# Patient Record
(demographics unavailable — no encounter records)

---

## 2024-10-18 NOTE — HEALTH RISK ASSESSMENT
[No] : No [No falls in past year] : Patient reported no falls in the past year [0] : 2) Feeling down, depressed, or hopeless: Not at all (0) [PHQ-2 Negative - No further assessment needed] : PHQ-2 Negative - No further assessment needed [Never] : Never [[id number]] : 0

## 2024-10-18 NOTE — PLAN
[FreeTextEntry1] : Asthma/cough - will treat with azithromycin.  Start singulair and symbicort, c/w albuterol prn Screening for metabolic disorders - will check labs HMT - UTD w/ cancer screenings  f/u in 1 month

## 2024-10-18 NOTE — REVIEW OF SYSTEMS
[Shortness Of Breath] : shortness of breath [Wheezing] : wheezing [Cough] : cough [Fever] : no fever [Night Sweats] : no night sweats [Discharge] : no discharge [Vision Problems] : no vision problems [Earache] : no earache [Nasal Discharge] : no nasal discharge [Chest Pain] : no chest pain [Orthopnea] : no orthopnea [Abdominal Pain] : no abdominal pain [Vomiting] : no vomiting [Dysuria] : no dysuria [Hematuria] : no hematuria [Joint Pain] : no joint pain [Muscle Pain] : no muscle pain [Itching] : no itching [Skin Rash] : no skin rash [Headache] : no headache [Memory Loss] : no memory loss [Suicidal] : not suicidal [Easy Bleeding] : no easy bleeding

## 2024-10-18 NOTE — HISTORY OF PRESENT ILLNESS
[FreeTextEntry8] : 47 year old female presents with complaints of cough and congestion for over 1 week. She went to urgent care last week, was started on albuterol and prednisone with improvement in symptoms, however, this week, her cough is productive with green sputum.  She has been using her albuterol more frequently as well.  Currently she denies any chest pain, shortness of breath or palpitations.

## 2024-10-18 NOTE — HEALTH RISK ASSESSMENT
[No] : No [No falls in past year] : Patient reported no falls in the past year [0] : 2) Feeling down, depressed, or hopeless: Not at all (0) [PHQ-2 Negative - No further assessment needed] : PHQ-2 Negative - No further assessment needed [Never] : Never [[id number]] : 0

## 2024-11-01 NOTE — PLAN
[FreeTextEntry1] : Asthma - much improved w/ advair, singulair.  c/w same HLD - borderline, advised diet/ls modifications HMT - to be addressed at next visit

## 2024-11-01 NOTE — HISTORY OF PRESENT ILLNESS
[FreeTextEntry1] : follow-up, asthma [de-identified] : 47 year old female presents for a f/u visit. She reports her prior complaints of cough, shortness of breath have all resolved.  Currently she denies any chest pain, shortness of breath or cough.

## 2025-02-19 NOTE — HISTORY OF PRESENT ILLNESS
[Spouse] : spouse [FreeTextEntry1] : follow-up, form completion, HLD [de-identified] : 47 year old female presents for form completion for her employer. Currently she has no acute medical complaints. She denies any chest pain, shortness of breath or cough.

## 2025-02-19 NOTE — PLAN
[FreeTextEntry1] : Asthma - much improved w/ advair, singulair.  c/w same HLD - borderline, advised diet/ls modifications Fatigue - will check labs HMT - UTD, form for employer completed and returned to patient.

## 2025-02-19 NOTE — HISTORY OF PRESENT ILLNESS
[Spouse] : spouse [FreeTextEntry1] : follow-up, form completion, HLD [de-identified] : 47 year old female presents for form completion for her employer. Currently she has no acute medical complaints. She denies any chest pain, shortness of breath or cough.